# Patient Record
Sex: MALE | Race: OTHER | HISPANIC OR LATINO
[De-identification: names, ages, dates, MRNs, and addresses within clinical notes are randomized per-mention and may not be internally consistent; named-entity substitution may affect disease eponyms.]

---
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
## 2020-11-16 ENCOUNTER — APPOINTMENT (OUTPATIENT)
Dept: PEDIATRICS | Facility: CLINIC | Age: 20
End: 2020-11-16
Payer: MEDICAID

## 2020-11-16 VITALS — TEMPERATURE: 99.6 F | WEIGHT: 150.3 LBS

## 2020-11-16 DIAGNOSIS — Z87.09 PERSONAL HISTORY OF OTHER DISEASES OF THE RESPIRATORY SYSTEM: ICD-10-CM

## 2020-11-16 PROCEDURE — 99072 ADDL SUPL MATRL&STAF TM PHE: CPT

## 2020-11-16 PROCEDURE — 99214 OFFICE O/P EST MOD 30 MIN: CPT

## 2020-11-16 RX ORDER — IBUPROFEN 800 MG/1
800 TABLET, FILM COATED ORAL 3 TIMES DAILY
Qty: 15 | Refills: 0 | Status: ACTIVE | COMMUNITY
Start: 2020-11-16 | End: 1900-01-01

## 2020-11-16 NOTE — HISTORY OF PRESENT ILLNESS
[de-identified] : Pt states lower back pain since yesterday [FreeTextEntry6] : 2 weeks low back pain insidious onset yesterday was "bad"\par no known injury\par doesn’t heavy lift\par isnt exercising a lot\par no loss bowel/bladder continence\par no numbness/tingling in the legs or elsewhere\par no weakness\par no bruising\par no dysuria\par no myalgias\par No fever, No cough, No ear pain, No nasal congestion\par No wheezing\par Normal appetite, No vomiting, No diarrhea\par \par \par no recent travel or contact with anyone suspected of or positive for covid-19\par

## 2020-11-16 NOTE — PHYSICAL EXAM
[NL] : no acute distress, alert [Normocephalic] : normocephalic [EOMI] : EOMI [Clear TM bilaterally] : clear tympanic membranes bilaterally [Clear to Auscultation Bilaterally] : clear to auscultation bilaterally [Regular Rate and Rhythm] : regular rate and rhythm [Normal S1, S2 audible] : normal S1, S2 audible [Soft] : soft [NonTender] : non tender [Non Distended] : non distended [No Abnormal Lymph Nodes Palpated] : no abnormal lymph nodes palpated [Moves All Extremities x 4] : moves all extremities x4 [Warm, Well Perfused x4] : warm, well perfused x4 [Capillary Refill <2s] : capillary refill < 2s [Normotonic] : normotonic [de-identified] : straigt leg raise test negative B/L, sacral tenderness midline no swelling, no deformity, is slow to get up and down off exam table [de-identified] : muscle strength WNL B/L lower extremities  [de-identified] : no rash no bruising

## 2020-12-13 ENCOUNTER — EMERGENCY (EMERGENCY)
Facility: HOSPITAL | Age: 20
LOS: 1 days | Discharge: HOME | End: 2020-12-13
Attending: EMERGENCY MEDICINE
Payer: SELF-PAY

## 2020-12-13 VITALS
RESPIRATION RATE: 18 BRPM | HEART RATE: 78 BPM | HEIGHT: 72 IN | WEIGHT: 160.06 LBS | OXYGEN SATURATION: 100 % | SYSTOLIC BLOOD PRESSURE: 125 MMHG | DIASTOLIC BLOOD PRESSURE: 82 MMHG | TEMPERATURE: 99 F

## 2020-12-13 PROCEDURE — 99282 EMERGENCY DEPT VISIT SF MDM: CPT | Mod: 25

## 2020-12-13 PROCEDURE — 99053 MED SERV 10PM-8AM 24 HR FAC: CPT

## 2020-12-13 PROCEDURE — 12001 RPR S/N/AX/GEN/TRNK 2.5CM/<: CPT

## 2020-12-13 PROCEDURE — 99283 EMERGENCY DEPT VISIT LOW MDM: CPT | Mod: 25

## 2020-12-13 PROCEDURE — 12001 RPR S/N/AX/GEN/TRNK 2.5CM/<: CPT | Mod: FA

## 2020-12-13 NOTE — ED PROVIDER NOTE - PATIENT PORTAL LINK FT
You can access the FollowMyHealth Patient Portal offered by [facility] by registering at the following website: [email or web address]. By joining [employer]’s FollowMyHealth portal, you will also be able to view your health information using other applications (apps) compatible with our system.

## 2020-12-13 NOTE — ED PROVIDER NOTE - ATTENDING CONTRIBUTION TO CARE
seen with acp; healthy young male with laceration to left hand s/p slipped while using ; on exam, lateral aspect of thenar eminence has a 3cm, clean edge, longitudinal laceration; sensation fully intact over thenar eminence, +able to oppose thumb to pinky; normal flexor and extensor functions throughout all digits; normal sensation and cap refill to distal aspect of all digits; agree with acp plan of care

## 2020-12-13 NOTE — ED PROVIDER NOTE - NSFOLLOWUPINSTRUCTIONS_ED_ALL_ED_FT
suture removal in 10-14 days  monitor for signs of infection such as redness puss from the wound or fevers   tylenol and or motrin for pain control     Laceration  A laceration is a cut that goes through all of the layers of the skin and into the tissue that is right under the skin. Some lacerations heal on their own. Others need to be closed with skin adhesive strips, skin glue, stitches (sutures), or staples. Proper laceration care minimizes the risk of infection and helps the laceration to heal better.  If non-absorbable stitches or staples have been placed, they must be taken out within the time frame instructed by your healthcare provider.    SEEK IMMEDIATE MEDICAL CARE IF YOU HAVE ANY OF THE FOLLOWING SYMPTOMS: swelling around the wound, worsening pain, drainage from the wound, red streaking going away from your wound, inability to move finger or toe near the laceration, or discoloration of skin near the laceration.

## 2020-12-13 NOTE — ED PROVIDER NOTE - PROGRESS NOTE DETAILS
wound repaired, advised on wound care and fu with pmd. given strict return precautions verbalizes understanding

## 2020-12-13 NOTE — ED PROVIDER NOTE - PHYSICAL EXAMINATION
2 cm laceration to the lateral aspect of the 1st digit on left hand no exposed tendon. + active bleeding  FROM of joint spaces. 5/5 finger strength + opposition.    2+ radial and ulnar pulse . sensation intact

## 2020-12-13 NOTE — ED PROVIDER NOTE - OBJECTIVE STATEMENT
20e yo male no significant past medical hx presenting to the ER with laceration  to the left 1st digit, cut with  at work. no numbness or tingling to the fingers. denies further injuries

## 2020-12-23 ENCOUNTER — EMERGENCY (EMERGENCY)
Facility: HOSPITAL | Age: 20
LOS: 1 days | Discharge: HOME | End: 2020-12-23
Attending: EMERGENCY MEDICINE
Payer: SELF-PAY

## 2020-12-23 VITALS
DIASTOLIC BLOOD PRESSURE: 74 MMHG | HEIGHT: 72 IN | SYSTOLIC BLOOD PRESSURE: 112 MMHG | RESPIRATION RATE: 18 BRPM | TEMPERATURE: 98 F | WEIGHT: 154.98 LBS | OXYGEN SATURATION: 96 % | HEART RATE: 108 BPM

## 2020-12-23 PROCEDURE — G0463: CPT

## 2020-12-23 PROCEDURE — L9995: CPT

## 2020-12-23 NOTE — ED PROVIDER NOTE - CLINICAL SUMMARY MEDICAL DECISION MAKING FREE TEXT BOX
PT with stable VS, no acute distress, non toxic appearing, tolerating PO in the ED, Pt neuro vasc intact, no s/s of infection, wound clean dry, suture removed, pt to be dc home with supportive care, follow up to PCP, educated about when to return to the ED if needed. PT verbalizes that he understands all instructions and results. Pt infomred that ED is open and availible 24/7 365 days a yr, encouraged to return to the ED if they have any change in condition, or feel the need for revaluation.

## 2020-12-23 NOTE — ED PROVIDER NOTE - PATIENT PORTAL LINK FT
You can access the FollowMyHealth Patient Portal offered by [facility] by registering at the following website: [email or web address]. By joining [employer]’s FollowMyHealth portal, you will also be able to view your health information using other applications (apps) compatible with our system.

## 2020-12-23 NOTE — ED PROVIDER NOTE - NS ED ROS FT
ROS: CONTUSIONAL: Denies fever, chills, fatigue, wt loss. HEAD: Denies trauma, HA, Dizziness. EYE: Denies Acute visual changes, diplopia. ENMT: Denies change in hearing, tinnitus, epistaxis, difficulty swallowing, sore throat. CARDIO: Denies CP, palpitations, edema. RESP: Denies Cough, SOB , Diff breathing, hemoptysis. GI: Denies N/V, ABD pain, change in bowel movement. URINARY: Denies difficulty urinating, pelvic pain. MS:  Denies joint pain, back pain, weakness, decreased ROM, swelling. NEURO: Denies change in gait, seizures, loss of sensation, dizziness, confusion LOC.  PSY: NO SI/HI. SKIN: +laceration, dines rash, discharge.

## 2020-12-23 NOTE — ED PROVIDER NOTE - CHPI ED SYMPTOMS NEG
no bleeding/no bleeding at site/no drainage/no fever/no inflammation/no pain/no purulent drainage/no redness/no red streaks/no chills

## 2020-12-23 NOTE — ED PROVIDER NOTE - NSFOLLOWUPINSTRUCTIONS_ED_ALL_ED_FT
Patient education: Stitches and staples (The Basics)  View in [employer]  Written by the doctors and editors at [facility]  What are stitches?  Stitches are a way doctors can close certain types of cuts. A doctor uses a special needle and thread to put in stitches. He or she sews the edges of the cut together and ties knots to hold the stitches in place (figure 1). The term doctors use for stitches is "sutures."    There are 2 main types of stitches:    ?Absorbable – These stitches dissolve over time. They do not need to be taken out.    ?Non-absorbable – These stitches need to be taken out after a certain amount of time. They do not dissolve.    What are staples?  Another way doctors can close cuts is with staples. Staples that go in the body are different from those used on paper. To put staples in, doctors use a special stapler (figure 2). Staples need to be taken out after a certain amount of time, just like non-absorbable stitches.    How do I know if I need stitches or staples?  You will need stitches or staples if your cut is wide, jagged, or goes all the way through your skin. A cut will heal on its own without stitches or staples, but they help a cut heal faster and leave less of a scar.    Minor cuts and scrapes that do not go all the way through the skin do not need stitches. If you get a cut and don't know if you need stitches, check with your doctor or nurse.    What happens when I get stitches or staples?  Before the doctor stitches or staples your cut, he or she will clean out the cut well. He or she will also give you numbing medicine so that you don't feel pain when the stitches or staples go in.    After the doctor stitches or staples your cut, he or she will cover the area with gauze or a bandage.    Why is it important to take care of my stitches or staples?  It's important to take care of your stitches or staples so that your cut heals well and doesn't get infected.    How do I take care of my stitches or staples?  Your doctor or nurse will give you specific instructions, depending on the type of stitches you have and where they are. Staples need the same type of care as non-absorbable stitches.    Here is some general advice you can follow:    ?Keep your stitches or staples dry and covered with a bandage. Non-absorbable stitches and staples need to be kept dry for 1 to 2 days. Absorbable stitches need to be kept dry longer. Your doctor or nurse will tell you exactly how long to keep your stitches dry.    ?Once you no longer need to keep your stitches or staples dry, gently wash them with soap and water whenever you take a shower. Do not put your stitches or staples underwater, such as in a bath, pool, or lake. Getting them too wet can slow down healing and raise your chance of getting an infection.    ?After you wash your stitches or staples, pat them dry and put an antibiotic ointment on them.    ?Cover your stitches or staples with a bandage or gauze, unless your doctor or nurse tells you not to.    ?Avoid activities or sports that could hurt the area of your stitches or staples for 1 to 2 weeks. (Your doctor or nurse will tell you exactly how long to avoid these activities.) If you hurt the same part of your body again, stitches can break, and the cut can open up again.    When should I call the doctor or nurse?  Call your doctor or nurse if:    ?Your stitches break or the cut opens up again.    ?You get a fever.    ?You have redness or swelling around the cut, or pus drains from the cut. It is normal for clear yellow fluid to drain from the cut in the first few days.    When will my stitches or staples be taken out?  The doctor who puts in the stitches or staples will tell you when to see your doctor or nurse to have them taken out. Non-absorbable stitches usually stay in for 5 to 14 days, depending on where they are. Staples usually stay in for 7 to 14 days because they are placed on parts of the body like the scalp, arms, or legs.    Staples need to be taken out with a special staple remover. But doctors' offices don't always have this device. Ask the doctor who puts in your staples for a staple remover. Then bring it to your doctor's office when you have your staples taken out.    What should I do after my stitches or staples are out?  After your stitches or staples are out, you should protect the scar from the sun. Use sunscreen on the area or wear clothes or a hat that covers the scar.    Your doctor or nurse might also recommend that you use certain lotions or creams to help your scar heal.

## 2020-12-23 NOTE — ED PROVIDER NOTE - ADDITIONAL NOTES AND INSTRUCTIONS:
PT was evaluated At [facility] ED and was found to have a condition that warranted time of to rest and heal from WORK/SCHOOL.   Jairon Flores PA-C

## 2021-01-30 ENCOUNTER — APPOINTMENT (OUTPATIENT)
Dept: PEDIATRICS | Facility: CLINIC | Age: 21
End: 2021-01-30

## 2021-09-19 ENCOUNTER — APPOINTMENT (OUTPATIENT)
Dept: PEDIATRICS | Facility: CLINIC | Age: 21
End: 2021-09-19
Payer: MEDICAID

## 2021-09-19 VITALS
HEART RATE: 88 BPM | WEIGHT: 141.4 LBS | SYSTOLIC BLOOD PRESSURE: 110 MMHG | BODY MASS INDEX: 19.36 KG/M2 | DIASTOLIC BLOOD PRESSURE: 60 MMHG | HEIGHT: 71.5 IN

## 2021-09-19 DIAGNOSIS — M54.5 LOW BACK PAIN: ICD-10-CM

## 2021-09-19 DIAGNOSIS — Z00.00 ENCOUNTER FOR GENERAL ADULT MEDICAL EXAMINATION W/OUT ABNORMAL FINDINGS: ICD-10-CM

## 2021-09-19 PROCEDURE — 92551 PURE TONE HEARING TEST AIR: CPT

## 2021-09-19 PROCEDURE — 99395 PREV VISIT EST AGE 18-39: CPT | Mod: 25

## 2021-09-19 PROCEDURE — 96160 PT-FOCUSED HLTH RISK ASSMT: CPT

## 2021-09-19 NOTE — RISK ASSESSMENT
[0] : 2) Feeling down, depressed, or hopeless: Not at all (0) [[id number]] : 0 [Have you ever had exercise-related chest pain or shortness of breath?] : Have you ever had exercise-related chest pain or shortness of breath? No [Have you ever fainted, passed out or had an unexplained seizure suddenly and without warning, especially during exercise or in response] : Have you ever fainted, passed out or had an unexplained seizure suddenly and without warning, especially during exercise or in response to sudden loud noises such as doorbells, alarm clocks and ringing telephones? No [Has anyone in your immediate family (parents, grandparents, siblings) or other more distant relatives (aunts, uncles, cousins)  of heart] : Has anyone in your immediate family (parents, grandparents, siblings) or other more distant relatives (aunts, uncles, cousins)  of heart problems or had an unexpected sudden death before age 50 (This would include unexpected drownings, unexplained car accidents in which the relative was driving or sudden infant death syndrome.)? No [Are you related to anyone with hypertrophic cardiomyopathy or hypertrophic obstructive cardiomyopathy, Marfan syndrome, arrhythmogenic] : Are you related to anyone with hypertrophic cardiomyopathy or hypertrophic obstructive cardiomyopathy, Marfan syndrome, arrhythmogenic right ventricular cardiomyopathy, long QT syndrome, short QT syndrome, Brugada syndrome or catecholaminergic polymorphic ventricular tachycardia, or anyone younger than 50 years with a pacemaker or implantable defibrillator? No [No Increased risk of SCA or SCD] : No Increased risk of SCA or SCD

## 2021-09-19 NOTE — DISCUSSION/SUMMARY
[Met privately with the adolescent for part of the office visit?] : Met privately with the adolescent for part of the office visit? Yes [Adolescent demonstrates understanding of his/her conditions and how to take prescribed medications?] : Adolescent demonstrates understanding of his/her conditions and how to take prescribed medications? Yes [Adolescent asks questions during each office  visit and participates in the care plan?] : Adolescent asks questions during each office visit and participates in the care plan? Yes [Adolescent is competent in independently making appointments, filling prescriptions, following up on referrals, and seeking emergency services, as needed?] : Adolescent is competent in independently making appointments, filling prescriptions, following up on referrals, and seeking emergency services, as needed? Yes [Discussed using Follow My Health to access health records and communicate with the adolescent's care team?] : Discussed using Follow My Health to access health records and communicate with the adolescent's care team? Yes [FreeTextEntry1] : none [Adolescent's caregivers were provided with the opportunity to discuss their concerns about transferring decision making responsibility to the adolescent?] : Adolescent's caregivers were provided with the opportunity to discuss their concerns about transferring decision making responsibility to the adolescent? No [Initiated discussion about transfer to an adult healthcare provider?] : Initiated discussion about transfer to an adult healthcare provider? Yes

## 2021-09-19 NOTE — PHYSICAL EXAM
[Alert] : alert [No Acute Distress] : no acute distress [Normocephalic] : normocephalic [EOMI Bilateral] : EOMI bilateral [Clear tympanic membranes with bony landmarks and light reflex present bilaterally] : clear tympanic membranes with bony landmarks and light reflex present bilaterally  [Pink Nasal Mucosa] : pink nasal mucosa [Nonerythematous Oropharynx] : nonerythematous oropharynx [No Palpable Masses] : no palpable masses [Supple, full passive range of motion] : supple, full passive range of motion [Clear to Auscultation Bilaterally] : clear to auscultation bilaterally [Regular Rate and Rhythm] : regular rate and rhythm [Normal S1, S2 audible] : normal S1, S2 audible [No Murmurs] : no murmurs [+2 Femoral Pulses] : +2 femoral pulses [Soft] : soft [NonTender] : non tender [Non Distended] : non distended [Normoactive Bowel Sounds] : normoactive bowel sounds [No Hepatomegaly] : no hepatomegaly [No Splenomegaly] : no splenomegaly [Mark: _____] : Mark [unfilled] [Circumcised] : circumcised [Bilateral descended testes] : bilateral descended testes [No Testicular Masses] : no testicular masses [No Abnormal Lymph Nodes Palpated] : no abnormal lymph nodes palpated [Normal Muscle Tone] : normal muscle tone [No Gait Asymmetry] : no gait asymmetry [No pain or deformities with palpation of bone, muscles, joints] : no pain or deformities with palpation of bone, muscles, joints [+2 Patella DTR] : +2 patella DTR [Straight] : straight [No Rash or Lesions] : no rash or lesions [No Scoliosis] : no scoliosis

## 2021-09-19 NOTE — HISTORY OF PRESENT ILLNESS
[Yes] : Patient has had sexual intercourse. [No] : No cigarette smoke exposure [HIV Screening Declined] : HIV Screening Declined [Has ways to cope with stress] : has ways to cope with stress [Displays self-confidence] : displays self-confidence [Has problems with sleep] : does not have problems with sleep [Gets depressed, anxious, or irritable/has mood swings] : does not get depressed, anxious, or irritable/has mood swings [Has thought about hurting self or considered suicide] : has not thought about hurting self or considered suicide [With Teen] : teen [FreeTextEntry7] : 21 year well check [de-identified] : none [FreeTextEntry1] : Patient is doing well - has no concerns or issues.  \par No reactions to previous vaccinations.\par Denies depression or psychiatric issues. \par No mental health issues, not in counseling.\par No suicidal ideations\par Appetite good, no issues\par Not sexually active\par Smokes MJ and occasional ETOH, no vaping, no drunk or impaired driving\par No tobacco exposure\par Sleeping well with good sleeping patterns \par No problems in school identified -  no ADD/ADHD concerns.\par No recent severe illness or injury,  no emergency room visit, and  no trauma to the head /concussion.\par PHQ9 and CRAFFT reviewed, no issues\par

## 2022-04-10 ENCOUNTER — EMERGENCY (EMERGENCY)
Facility: HOSPITAL | Age: 22
LOS: 1 days | Discharge: HOME | End: 2022-04-10
Attending: EMERGENCY MEDICINE
Payer: COMMERCIAL

## 2022-04-10 VITALS
TEMPERATURE: 98 F | HEIGHT: 71 IN | WEIGHT: 156.97 LBS | HEART RATE: 154 BPM | SYSTOLIC BLOOD PRESSURE: 108 MMHG | RESPIRATION RATE: 20 BRPM | DIASTOLIC BLOOD PRESSURE: 78 MMHG | OXYGEN SATURATION: 98 %

## 2022-04-10 VITALS — OXYGEN SATURATION: 98 % | HEART RATE: 76 BPM | RESPIRATION RATE: 18 BRPM

## 2022-04-10 LAB
ALBUMIN SERPL ELPH-MCNC: 5 G/DL — SIGNIFICANT CHANGE UP (ref 3.3–5.2)
ALP SERPL-CCNC: 62 U/L — SIGNIFICANT CHANGE UP (ref 40–120)
ALT FLD-CCNC: 13 U/L — SIGNIFICANT CHANGE UP
ANION GAP SERPL CALC-SCNC: 24 MMOL/L — HIGH (ref 5–17)
AST SERPL-CCNC: 20 U/L — SIGNIFICANT CHANGE UP
BILIRUB SERPL-MCNC: 0.7 MG/DL — SIGNIFICANT CHANGE UP (ref 0.4–2)
BUN SERPL-MCNC: 9.3 MG/DL — SIGNIFICANT CHANGE UP (ref 8–20)
CALCIUM SERPL-MCNC: 9.5 MG/DL — SIGNIFICANT CHANGE UP (ref 8.6–10.2)
CHLORIDE SERPL-SCNC: 97 MMOL/L — LOW (ref 98–107)
CO2 SERPL-SCNC: 16 MMOL/L — LOW (ref 22–29)
CREAT SERPL-MCNC: 0.86 MG/DL — SIGNIFICANT CHANGE UP (ref 0.5–1.3)
EGFR: 126 ML/MIN/1.73M2 — SIGNIFICANT CHANGE UP
GLUCOSE SERPL-MCNC: 116 MG/DL — HIGH (ref 70–99)
HCT VFR BLD CALC: 41.9 % — SIGNIFICANT CHANGE UP (ref 39–50)
HGB BLD-MCNC: 14.6 G/DL — SIGNIFICANT CHANGE UP (ref 13–17)
MCHC RBC-ENTMCNC: 30.9 PG — SIGNIFICANT CHANGE UP (ref 27–34)
MCHC RBC-ENTMCNC: 34.8 GM/DL — SIGNIFICANT CHANGE UP (ref 32–36)
MCV RBC AUTO: 88.8 FL — SIGNIFICANT CHANGE UP (ref 80–100)
PLATELET # BLD AUTO: 410 K/UL — HIGH (ref 150–400)
POTASSIUM SERPL-MCNC: 3.5 MMOL/L — SIGNIFICANT CHANGE UP (ref 3.5–5.3)
POTASSIUM SERPL-SCNC: 3.5 MMOL/L — SIGNIFICANT CHANGE UP (ref 3.5–5.3)
PROT SERPL-MCNC: 7.5 G/DL — SIGNIFICANT CHANGE UP (ref 6.6–8.7)
RBC # BLD: 4.72 M/UL — SIGNIFICANT CHANGE UP (ref 4.2–5.8)
RBC # FLD: 11.9 % — SIGNIFICANT CHANGE UP (ref 10.3–14.5)
SODIUM SERPL-SCNC: 137 MMOL/L — SIGNIFICANT CHANGE UP (ref 135–145)
TSH SERPL-MCNC: 0.65 UIU/ML — SIGNIFICANT CHANGE UP (ref 0.27–4.2)
WBC # BLD: 10.81 K/UL — HIGH (ref 3.8–10.5)
WBC # FLD AUTO: 10.81 K/UL — HIGH (ref 3.8–10.5)

## 2022-04-10 PROCEDURE — 84443 ASSAY THYROID STIM HORMONE: CPT

## 2022-04-10 PROCEDURE — 96374 THER/PROPH/DIAG INJ IV PUSH: CPT

## 2022-04-10 PROCEDURE — 80053 COMPREHEN METABOLIC PANEL: CPT

## 2022-04-10 PROCEDURE — 99284 EMERGENCY DEPT VISIT MOD MDM: CPT | Mod: 25

## 2022-04-10 PROCEDURE — 99284 EMERGENCY DEPT VISIT MOD MDM: CPT

## 2022-04-10 PROCEDURE — 85027 COMPLETE CBC AUTOMATED: CPT

## 2022-04-10 PROCEDURE — 36415 COLL VENOUS BLD VENIPUNCTURE: CPT

## 2022-04-10 PROCEDURE — 93005 ELECTROCARDIOGRAM TRACING: CPT

## 2022-04-10 PROCEDURE — 93010 ELECTROCARDIOGRAM REPORT: CPT

## 2022-04-10 RX ORDER — SODIUM CHLORIDE 9 MG/ML
1000 INJECTION INTRAMUSCULAR; INTRAVENOUS; SUBCUTANEOUS ONCE
Refills: 0 | Status: COMPLETED | OUTPATIENT
Start: 2022-04-10 | End: 2022-04-10

## 2022-04-10 RX ADMIN — Medication 1 MILLIGRAM(S): at 19:57

## 2022-04-10 RX ADMIN — SODIUM CHLORIDE 1000 MILLILITER(S): 9 INJECTION INTRAMUSCULAR; INTRAVENOUS; SUBCUTANEOUS at 19:50

## 2022-04-10 NOTE — ED PROVIDER NOTE - NSFOLLOWUPCLINICS_GEN_ALL_ED_FT
[facility]  Cardiology  [address], [address]  [place] [address]  Phone: [phone]  Fax:

## 2022-04-10 NOTE — ED PROVIDER NOTE - PHYSICAL EXAMINATION
Gen: Well appearing in NAD  Head: NC/AT  Neck: trachea midline  Cardiac: tachycardic, regular rhythm  Resp:  No distress; CTAB  Abd: Soft, NT/ND  Ext: no deformities  Neuro:  A&O appears non focal  Skin:  Warm and dry as visualized  Psych: (+) anxiety

## 2022-04-10 NOTE — ED PROVIDER NOTE - OBJECTIVE STATEMENT
23 y/o male with PMHx of heart murmur presents to ED c/o palpitations. Patient drank 1/2 a bottle of an energy drink, smoked marijuana and nicotine about 1 hour ago while playing video games. Patient states he has been stressed recently due to his mother being in the hospital.     Denies other drug use, surgical hx, smoking tobacco, family cardiac hx 21 y/o male with PMHx of heart murmur presents to ED c/o palpitations. Patient drank 1/2 a bottle of an energy drink, smoked marijuana and vaped nicotine about 1 hour ago while playing video games. Patient states he has been stressed recently due to his mother being in the hospital.     Denies other drug use, surgical hx, smoking tobacco, family cardiac hx

## 2022-04-10 NOTE — ED PROVIDER NOTE - NSFOLLOWUPINSTRUCTIONS_ED_ALL_ED_FT
- Follow up with your doctor within 2-3 days.   - Follow up with Cardiology, see information above.   - Bring results with you to the appointment.   - Return to the ED for any new or worsening symptoms.     Palpitations    A palpitation is the feeling that your heartbeat is irregular or is faster than normal. It may feel like your heart is fluttering or skipping a beat. They may be caused by many things, including smoking, caffeine, alcohol, stress, and certain medicines. Although most causes of palpitations are not serious, palpitations can be a sign of a serious medical problem. Avoid caffeine, alcohol, and tobacco products at home. Try to reduce stress and anxiety and make sure to get plenty of rest.     SEEK IMMEDIATE MEDICAL CARE IF YOU HAVE ANY OF THE FOLLOWING SYMPTOMS: chest pain, shortness of breath, severe headache, dizziness/lightheadedness, or fainting.

## 2022-04-10 NOTE — ED ADULT NURSE NOTE - OBJECTIVE STATEMENT
PT. c/o palpitations, difficulty "catching my breath".  Pt. states he drank 1/2 bottle of energy drink while hyacinth and felt his heart racing, also c/o bilateral hand numbness.  Denies pain.  friend bedside. PT. c/o palpitations, difficulty "catching my breath".  Pt. states he drank 1/2 bottle of energy drink while hyacinth and felt his heart racing, also c/o bilateral hand numbness.  Denies pain.  friend bedside.  also vaped nicotine and weed.

## 2022-04-10 NOTE — ED PROVIDER NOTE - CLINICAL SUMMARY MEDICAL DECISION MAKING FREE TEXT BOX
Patient with palpitations and anxiety s/p energy drink, weed, and nicotine and feeling stressed with mother in the hospital. Plan for EKG, labs, fluids, benzo' s, and re-assess.

## 2022-04-10 NOTE — ED PROVIDER NOTE - PROGRESS NOTE DETAILS
Leoncio MARTINES for ED attending, Dr. Heredia. Reassess patient, states he is feeling better. While he was having palpitations, states he felt tingling around his mouth, and fingers and felt like he was going to die. HR is down to 110. Yan ESPANA: symptoms resolved, HR in 70s. Encouraged to follow with PMD. Cards follow up given. Return precautions given. Leoncio MARTINES for ED attending, Dr. Heredia. Reasseseds patient, states he is feeling better. While he was having palpitations, states he felt tingling around his mouth, and fingers and felt like he was going to die. HR is down to 110.

## 2022-04-10 NOTE — ED PROVIDER NOTE - PATIENT PORTAL LINK FT
You can access the FollowMyHealth Patient Portal offered by [facility] by registering at the following website: [email or web address]. By joining [employer]’s FollowMyHealth portal, you will also be able to view your health information using other applications (apps) compatible with our system.

## 2022-09-09 NOTE — ED PROVIDER NOTE - ATTENDING CONTRIBUTION TO CARE
Called pt's Reyna SCHOFIELD. Left vm to return call.     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 No             ANTIPLATELET / ANTICOAGULATION: MEDICATION:  None  Latex allergy: No  Sleep apnea: No  Location: [facility]  Special Instructions:   MAC Anesthesia  For MAC/GA patients if applicable, please verify to hold medications prior to procedure: Selegiline patches x 10 days  Sildenafil, Verdenafil, Tadalafil x 48 hours   Monoamine oxidase inhibitor (MAOIs), angiotensin receptor blockers, renin inhibitors, ACE inhibitors, diuretics (unless in combination with beta blocker) day of procedure        I, Destin Lockhart, have personally performed a face to face diagnostic evaluation on this patient. I have reviewed the ACP note and agree with the history, exam and plan of care, except as noted.    21 yo M p/w suture removal. patient had laceration to left 1st digit on 12/13. wound is well healed. no surrounding erythema. sutures removed.

## 2023-11-04 ENCOUNTER — EMERGENCY (EMERGENCY)
Facility: HOSPITAL | Age: 23
LOS: 1 days | Discharge: HOME | End: 2023-11-04
Attending: EMERGENCY MEDICINE
Payer: COMMERCIAL

## 2023-11-04 VITALS
HEIGHT: 72 IN | DIASTOLIC BLOOD PRESSURE: 67 MMHG | WEIGHT: 171.52 LBS | RESPIRATION RATE: 20 BRPM | HEART RATE: 108 BPM | OXYGEN SATURATION: 96 % | TEMPERATURE: 98 F | SYSTOLIC BLOOD PRESSURE: 125 MMHG

## 2023-11-04 PROCEDURE — 99284 EMERGENCY DEPT VISIT MOD MDM: CPT | Mod: 25

## 2023-11-04 PROCEDURE — 93010 ELECTROCARDIOGRAM REPORT: CPT

## 2023-11-04 NOTE — ED ADULT TRIAGE NOTE - CHIEF COMPLAINT QUOTE
Patient presents to ED with heart palpitations with h/o SVT that started about one hour ago associated with lightheadedness.  Denies Drugs/ETOH

## 2023-11-05 ENCOUNTER — EMERGENCY (EMERGENCY)
Facility: HOSPITAL | Age: 23
LOS: 1 days | Discharge: HOME | End: 2023-11-05
Attending: EMERGENCY MEDICINE
Payer: COMMERCIAL

## 2023-11-05 VITALS
OXYGEN SATURATION: 96 % | HEART RATE: 64 BPM | DIASTOLIC BLOOD PRESSURE: 67 MMHG | TEMPERATURE: 97 F | RESPIRATION RATE: 20 BRPM | SYSTOLIC BLOOD PRESSURE: 115 MMHG

## 2023-11-05 VITALS
OXYGEN SATURATION: 98 % | DIASTOLIC BLOOD PRESSURE: 72 MMHG | TEMPERATURE: 98 F | RESPIRATION RATE: 18 BRPM | SYSTOLIC BLOOD PRESSURE: 118 MMHG | HEART RATE: 68 BPM

## 2023-11-05 VITALS
HEIGHT: 72 IN | HEART RATE: 64 BPM | TEMPERATURE: 98 F | RESPIRATION RATE: 18 BRPM | DIASTOLIC BLOOD PRESSURE: 76 MMHG | WEIGHT: 168.87 LBS | SYSTOLIC BLOOD PRESSURE: 117 MMHG | OXYGEN SATURATION: 97 %

## 2023-11-05 LAB
ALBUMIN SERPL ELPH-MCNC: 4.6 G/DL — SIGNIFICANT CHANGE UP (ref 3.3–5.2)
ALBUMIN SERPL ELPH-MCNC: 4.6 G/DL — SIGNIFICANT CHANGE UP (ref 3.3–5.2)
ALP SERPL-CCNC: 72 U/L — SIGNIFICANT CHANGE UP (ref 40–120)
ALP SERPL-CCNC: 72 U/L — SIGNIFICANT CHANGE UP (ref 40–120)
ALT FLD-CCNC: 17 U/L — SIGNIFICANT CHANGE UP
ALT FLD-CCNC: 17 U/L — SIGNIFICANT CHANGE UP
ANION GAP SERPL CALC-SCNC: 14 MMOL/L — SIGNIFICANT CHANGE UP (ref 5–17)
ANION GAP SERPL CALC-SCNC: 14 MMOL/L — SIGNIFICANT CHANGE UP (ref 5–17)
AST SERPL-CCNC: 20 U/L — SIGNIFICANT CHANGE UP
AST SERPL-CCNC: 20 U/L — SIGNIFICANT CHANGE UP
BILIRUB SERPL-MCNC: 0.7 MG/DL — SIGNIFICANT CHANGE UP (ref 0.4–2)
BILIRUB SERPL-MCNC: 0.7 MG/DL — SIGNIFICANT CHANGE UP (ref 0.4–2)
BUN SERPL-MCNC: 9.9 MG/DL — SIGNIFICANT CHANGE UP (ref 8–20)
BUN SERPL-MCNC: 9.9 MG/DL — SIGNIFICANT CHANGE UP (ref 8–20)
CALCIUM SERPL-MCNC: 8.9 MG/DL — SIGNIFICANT CHANGE UP (ref 8.4–10.5)
CALCIUM SERPL-MCNC: 8.9 MG/DL — SIGNIFICANT CHANGE UP (ref 8.4–10.5)
CHLORIDE SERPL-SCNC: 99 MMOL/L — SIGNIFICANT CHANGE UP (ref 96–108)
CHLORIDE SERPL-SCNC: 99 MMOL/L — SIGNIFICANT CHANGE UP (ref 96–108)
CO2 SERPL-SCNC: 25 MMOL/L — SIGNIFICANT CHANGE UP (ref 22–29)
CO2 SERPL-SCNC: 25 MMOL/L — SIGNIFICANT CHANGE UP (ref 22–29)
CREAT SERPL-MCNC: 0.93 MG/DL — SIGNIFICANT CHANGE UP (ref 0.5–1.3)
CREAT SERPL-MCNC: 0.93 MG/DL — SIGNIFICANT CHANGE UP (ref 0.5–1.3)
EGFR: 118 ML/MIN/1.73M2 — SIGNIFICANT CHANGE UP
EGFR: 118 ML/MIN/1.73M2 — SIGNIFICANT CHANGE UP
GLUCOSE SERPL-MCNC: 87 MG/DL — SIGNIFICANT CHANGE UP (ref 70–99)
GLUCOSE SERPL-MCNC: 87 MG/DL — SIGNIFICANT CHANGE UP (ref 70–99)
HCT VFR BLD CALC: 43.8 % — SIGNIFICANT CHANGE UP (ref 39–50)
HCT VFR BLD CALC: 43.8 % — SIGNIFICANT CHANGE UP (ref 39–50)
HGB BLD-MCNC: 15.5 G/DL — SIGNIFICANT CHANGE UP (ref 13–17)
HGB BLD-MCNC: 15.5 G/DL — SIGNIFICANT CHANGE UP (ref 13–17)
MCHC RBC-ENTMCNC: 31.1 PG — SIGNIFICANT CHANGE UP (ref 27–34)
MCHC RBC-ENTMCNC: 31.1 PG — SIGNIFICANT CHANGE UP (ref 27–34)
MCHC RBC-ENTMCNC: 35.4 GM/DL — SIGNIFICANT CHANGE UP (ref 32–36)
MCHC RBC-ENTMCNC: 35.4 GM/DL — SIGNIFICANT CHANGE UP (ref 32–36)
MCV RBC AUTO: 87.8 FL — SIGNIFICANT CHANGE UP (ref 80–100)
MCV RBC AUTO: 87.8 FL — SIGNIFICANT CHANGE UP (ref 80–100)
PLATELET # BLD AUTO: 297 K/UL — SIGNIFICANT CHANGE UP (ref 150–400)
PLATELET # BLD AUTO: 297 K/UL — SIGNIFICANT CHANGE UP (ref 150–400)
POTASSIUM SERPL-MCNC: 3.8 MMOL/L — SIGNIFICANT CHANGE UP (ref 3.5–5.3)
POTASSIUM SERPL-MCNC: 3.8 MMOL/L — SIGNIFICANT CHANGE UP (ref 3.5–5.3)
POTASSIUM SERPL-SCNC: 3.8 MMOL/L — SIGNIFICANT CHANGE UP (ref 3.5–5.3)
POTASSIUM SERPL-SCNC: 3.8 MMOL/L — SIGNIFICANT CHANGE UP (ref 3.5–5.3)
PROT SERPL-MCNC: 7.1 G/DL — SIGNIFICANT CHANGE UP (ref 6.6–8.7)
PROT SERPL-MCNC: 7.1 G/DL — SIGNIFICANT CHANGE UP (ref 6.6–8.7)
RBC # BLD: 4.99 M/UL — SIGNIFICANT CHANGE UP (ref 4.2–5.8)
RBC # BLD: 4.99 M/UL — SIGNIFICANT CHANGE UP (ref 4.2–5.8)
RBC # FLD: 11.9 % — SIGNIFICANT CHANGE UP (ref 10.3–14.5)
RBC # FLD: 11.9 % — SIGNIFICANT CHANGE UP (ref 10.3–14.5)
SODIUM SERPL-SCNC: 138 MMOL/L — SIGNIFICANT CHANGE UP (ref 135–145)
SODIUM SERPL-SCNC: 138 MMOL/L — SIGNIFICANT CHANGE UP (ref 135–145)
TSH SERPL-MCNC: 0.48 UIU/ML — SIGNIFICANT CHANGE UP (ref 0.27–4.2)
TSH SERPL-MCNC: 0.48 UIU/ML — SIGNIFICANT CHANGE UP (ref 0.27–4.2)
WBC # BLD: 9.49 K/UL — SIGNIFICANT CHANGE UP (ref 3.8–10.5)
WBC # BLD: 9.49 K/UL — SIGNIFICANT CHANGE UP (ref 3.8–10.5)
WBC # FLD AUTO: 9.49 K/UL — SIGNIFICANT CHANGE UP (ref 3.8–10.5)
WBC # FLD AUTO: 9.49 K/UL — SIGNIFICANT CHANGE UP (ref 3.8–10.5)

## 2023-11-05 PROCEDURE — 93010 ELECTROCARDIOGRAM REPORT: CPT | Mod: 76

## 2023-11-05 PROCEDURE — 93005 ELECTROCARDIOGRAM TRACING: CPT

## 2023-11-05 PROCEDURE — 93010 ELECTROCARDIOGRAM REPORT: CPT

## 2023-11-05 PROCEDURE — 36415 COLL VENOUS BLD VENIPUNCTURE: CPT

## 2023-11-05 PROCEDURE — 84443 ASSAY THYROID STIM HORMONE: CPT

## 2023-11-05 PROCEDURE — 96360 HYDRATION IV INFUSION INIT: CPT

## 2023-11-05 PROCEDURE — 99284 EMERGENCY DEPT VISIT MOD MDM: CPT

## 2023-11-05 PROCEDURE — 99283 EMERGENCY DEPT VISIT LOW MDM: CPT | Mod: 25

## 2023-11-05 PROCEDURE — 80053 COMPREHEN METABOLIC PANEL: CPT

## 2023-11-05 PROCEDURE — 85027 COMPLETE CBC AUTOMATED: CPT

## 2023-11-05 RX ORDER — SODIUM CHLORIDE 9 MG/ML
1000 INJECTION INTRAMUSCULAR; INTRAVENOUS; SUBCUTANEOUS ONCE
Refills: 0 | Status: COMPLETED | OUTPATIENT
Start: 2023-11-05 | End: 2023-11-05

## 2023-11-05 RX ORDER — METOPROLOL TARTRATE 50 MG
25 TABLET ORAL ONCE
Refills: 0 | Status: COMPLETED | OUTPATIENT
Start: 2023-11-05 | End: 2023-11-05

## 2023-11-05 RX ADMIN — SODIUM CHLORIDE 1000 MILLILITER(S): 9 INJECTION INTRAMUSCULAR; INTRAVENOUS; SUBCUTANEOUS at 20:55

## 2023-11-05 RX ADMIN — SODIUM CHLORIDE 1000 MILLILITER(S): 9 INJECTION INTRAMUSCULAR; INTRAVENOUS; SUBCUTANEOUS at 19:52

## 2023-11-05 RX ADMIN — Medication 25 MILLIGRAM(S): at 19:55

## 2023-11-05 NOTE — ED ADULT NURSE NOTE - OBJECTIVE STATEMENT
Presents ed with complaints of palpitations, denies chest pain , denies sob. Patient says " I was playing video games and started to feel lightheaded and palpitations. says " I feel fine now". Patient reports hx of svt. Doesn't take any meds.

## 2023-11-05 NOTE — ED PROVIDER NOTE - CLINICAL SUMMARY MEDICAL DECISION MAKING FREE TEXT BOX
Pt with palpitations with check EKG give IV fluids, check TSH, cbc, cmp. Discussed with pt necessity for outpatient cardiology follow-up Pt with palpitations with check EKG give IV fluids, check TSH, cbc, cmp. Discussed with pt necessity for outpatient cardiology follow-up    Patient resting comfortably no acute distress at this time.  Labs reviewed–within normal limits.  EKG reviewed no acute abnormalities.  Patient stable for discharge will follow-up with his cardiologist outpatient.

## 2023-11-05 NOTE — ED PROVIDER NOTE - ATTENDING APP SHARED VISIT CONTRIBUTION OF CARE
I, Diana Chahal, performed the initial face to face bedside interview with this patient regarding history of present illness, review of symptoms and relevant past medical, social and family history.  I completed an independent physical examination.  I was the initial provider who evaluated this patient. I have signed out the follow up of any pending tests (i.e. labs, radiological studies) to the ACP.  I have communicated the patient’s plan of care and disposition with the ACP.  The history, relevant review of systems, past medical and surgical history, medical decision making, and physical examination was documented by the scribe in my presence and I attest to the accuracy of the documentation.

## 2023-11-05 NOTE — ED PROVIDER NOTE - NS ED ROS FT
Const: Denies fever, chills  HEENT: Denies blurry vision, sore throat  Neck: Denies neck pain/stiffness  Resp: + SOB (resolved). Denies coughing  Cardiovascular: + palpitations (resolved). Denies CP, LE edema  GI: Denies nausea, vomiting, abdominal pain, diarrhea, constipation, blood in stool  : Denies urinary frequency/urgency/dysuria, hematuria  MSK: Denies back pain  Neuro: Denies HA, dizziness, numbness, weakness  Skin: Denies rashes.

## 2023-11-05 NOTE — ED ADULT NURSE NOTE - NSFALLUNIVINTERV_ED_ALL_ED
Bed/Stretcher in lowest position, wheels locked, appropriate side rails in place/Call bell, personal items and telephone in reach/Instruct patient to call for assistance before getting out of bed/chair/stretcher/Non-slip footwear applied when patient is off stretcher/[place] to call system/Physically safe environment - no spills, clutter or unnecessary equipment/Purposeful proactive rounding/Room/bathroom lighting operational, light cord in reach

## 2023-11-05 NOTE — ED PROVIDER NOTE - CARE PROVIDER_API CALL
Cuong Guzman  Cardiovascular Disease  [address] [place] [id number]  Phone: [phone]  Fax: [phone]  Follow Up Time: Urgent

## 2023-11-05 NOTE — ED PROVIDER NOTE - NSFOLLOWUPINSTRUCTIONS_ED_ALL_ED_FT
Supraventricular Tachycardia, Adult  Supraventricular tachycardia (SVT) is a kind of abnormal heartbeat. It makes your heart beat very fast. This may last for a short time and then return to normal, or it may last longer.    A normal resting heartbeat is 60–100 times a minute. This condition can make your heart beat more than 150 times a minute. Times of having a fast heartbeat (episodes) can be scary, but they are usually not dangerous. In some cases, they may lead to heart failure if they:  Happen many times a day.  Last longer than a few seconds.  What are the causes?    This condition happens when electrical signals are sent out from areas of the heart that do not normally send signals for the heartbeat.    What increases the risk?  You are more likely to develop this condition if you are:  Middle aged or younger.  Female.  The following factors may also make you more likely to develop this condition:  Stress.  Feeling worried or nervous (anxiety).  Tiredness.  Smoking.  Stimulant drugs, such as cocaine and methamphetamine.  Alcohol.  Caffeine.  Pregnancy.  Having certain medical conditions.  What are the signs or symptoms?  A pounding heart.  A feeling that your heart is skipping beats (palpitations).  Weakness.  Trouble getting enough air.  Pain or tightness in your chest.  Dizziness or feeling like you are going to pass out (faint).  Feeling worried or nervous.  Sweating.  Feeling like you may vomit (nausea).  Passing out.  Tiredness.  Sometimes, there are no symptoms.    How is this treated?  Treatment may include:  Vagal nerve stimulation. Ways to do this include:  Holding your breath and pushing, as though you are pooping (having a bowel movement).  Massaging an area on one side of your neck. Do not try this yourself. Only a doctor should do this. If done the wrong way, it can lead to a stroke.  Bending forward with your head between your legs.  Coughing while bending forward with your head between your legs.  Putting an ice-cold, wet towel on your face.  Medicines that prevent attacks.  Medicine to stop an attack given through an IV tube at the hospital.  A small electric shock (cardioversion) that stops an attack.  A procedure to get rid of cells in the area that is causing the fast heartbeats (radiofrequency ablation).  If you do not have symptoms, you may not need treatment.    Follow these instructions at home:  Stress    Avoid things that make you feel stressed.  To deal with stress, try:  Doing yoga or meditation.  Being out in nature.  Listening to relaxing music.  Doing deep breathing.  Taking steps to be healthy, such as getting lots of sleep, exercising, and eating a balanced diet.  Talking with a mental health doctor.  Lifestyle      Try to get at least 7 hours of sleep each night.  Do not smoke or use any products that contain nicotine or tobacco. If you need help quitting, ask your doctor.  Do not drink alcohol if it gives you a fast heartbeat.  If alcohol does not seem to give you a fast heartbeat, limit your alcohol use. If you drink alcohol:  Limit how much you have to:  0–1 drink a day for women who are not pregnant.  0–2 drinks a day for men.  Know how much alcohol is in your drink. In the [place] one drink equals one 12 oz bottle of beer (355 mL), one 5 oz glass of wine (148 mL), or one 1½ oz glass of hard liquor (44 mL).  Be aware of how caffeine affects you.  If caffeine gives you a fast heartbeat, do not eat, drink, or use anything with caffeine in it.  If caffeine does not seem to give you a fast heartbeat, limit how much caffeine you eat, drink, or use.  Do not use stimulant drugs. If you need help quitting, ask your doctor.  General instructions    Stay at a healthy weight.  Exercise regularly. Ask your doctor about good activities for you. Try one or a mixture of these:  150 minutes a week of gentle exercise, like walking or yoga.  75 minutes a week of exercise that is very active, like running or swimming.  Do vagus nerve treatments to slow down your heartbeat as told by your doctor.  Take over-the-counter and prescription medicines only as told by your doctor.  Keep all follow-up visits.  Contact a doctor if:  You have a fast heartbeat more often.  Times of having a fast heartbeat last longer than before.  Home treatments to slow down your heartbeat do not help.  You have new symptoms.  Get help right away if:  You have chest pain.  Your symptoms get worse.  You have trouble breathing.  Your heart beats very fast for more than 20 minutes.  You pass out.  These symptoms may be an emergency. Get medical help right away. Call your local emergency services (911 in the [place]  Do not wait to see if the symptoms will go away.  Do not drive yourself to the hospital.  Summary  SVT is a type of abnormal heartbeat.  This condition can make your heart beat more than 150 times a minute.  If you do not have symptoms, you may not need treatment.  This information is not intended to replace advice given to you by your health care provider. Make sure you discuss any questions you have with your health care provider.

## 2023-11-05 NOTE — ED ADULT NURSE NOTE - NSFALLUNIVINTERV_ED_ALL_ED
Bed/Stretcher in lowest position, wheels locked, appropriate side rails in place/Call bell, personal items and telephone in reach/Instruct patient to call for assistance before getting out of bed/chair/stretcher/Non-slip footwear applied when patient is off stretcher/[place] to call system/Physically safe environment - no spills, clutter or unnecessary equipment/Purposeful proactive rounding/Room/bathroom lighting operational, light cord in reach

## 2023-11-05 NOTE — ED PROVIDER NOTE - OBJECTIVE STATEMENT
24 y/o male with PMHx of SVT presents to the ED c/o palpitations. Pt reports that he has been having palpitations for a year, had oe episode of SVT, saw cards, had 24 hour holter, has not [id number] since. Pt states he has been having episodes throughout the day. Pt denies drug use, denies caffeine use, denies steroid use, denies diarrhea, denies weight loss. Pt was seen in ED yesterday and was monitored on telemetry without any findings.

## 2023-11-05 NOTE — ED ADULT TRIAGE NOTE - CHIEF COMPLAINT QUOTE
pt intermittent palpitations that started yesterday and was here in ED yesterday for same pt has hx SVT

## 2023-11-05 NOTE — ED ADULT NURSE NOTE - OBJECTIVE STATEMENT
pt came to the for c/o intermittent palpitations that started yesterday and was here in ED yesterday for same pt has hx SVT

## 2023-11-05 NOTE — ED PROVIDER NOTE - NSFOLLOWUPINSTRUCTIONS_ED_ALL_ED_FT
Palpitations    A palpitation is the feeling that your heartbeat is irregular or is faster than normal. It may feel like your heart is fluttering or skipping a beat. They may be caused by many things, including smoking, caffeine, alcohol, stress, and certain medicines. Although most causes of palpitations are not serious, palpitations can be a sign of a serious medical problem. Avoid caffeine, alcohol, and tobacco products at home. Try to reduce stress and anxiety and make sure to get plenty of rest.     SEEK IMMEDIATE MEDICAL CARE IF YOU HAVE ANY OF THE FOLLOWING SYMPTOMS: chest pain, shortness of breath, severe headache, dizziness/lightheadedness, or fainting.    FOLLOW UP WITH YOUR CARDIOLOGIST WITHIN 1 WEEK.

## 2023-11-05 NOTE — ED PROVIDER NOTE - PHYSICAL EXAMINATION
Gen: anxious appearing  Head: NC/AT  Neck: trachea midline  Card: regular rate and rhythm  Resp:  No distress, CTAB  Abd: soft, non-tender, non-distended  Ext: no deformities  Neuro:  A&O appears non focal  Skin:  Warm, diaphoretic,

## 2023-11-05 NOTE — ED PROVIDER NOTE - OBJECTIVE STATEMENT
23-year-old male with past medical history SVT (not on medication) presents for palpitations that started when he was playing video games lasting about an hour to an hour and a half.  It self resolved.  He has not seen a cardiologist in 2 years.  He did wear an event monitor for 24 hours 2 years ago without events.  He notes 2 episodes last year and this is his first episode this year.  When he had the palpitations he felt lightheaded and short of breath.  He now feels well.  He previously followed with [place] [facility].  He occasionally drinks (did not drink alcohol today), denies smoking or illicit drugs.

## 2023-11-05 NOTE — ED PROVIDER NOTE - PATIENT PORTAL LINK FT
You can access the FollowMyHealth Patient Portal offered by [facility] by registering at the following website: [email or web address]. By joining [employer]’s FollowMyHealth portal, you will also be able to view your health information using other applications (apps) compatible with our system.

## 2023-11-06 ENCOUNTER — EMERGENCY (EMERGENCY)
Facility: HOSPITAL | Age: 23
LOS: 1 days | Discharge: HOME | End: 2023-11-06
Attending: EMERGENCY MEDICINE
Payer: COMMERCIAL

## 2023-11-06 VITALS
TEMPERATURE: 98 F | SYSTOLIC BLOOD PRESSURE: 131 MMHG | WEIGHT: 170.64 LBS | OXYGEN SATURATION: 98 % | DIASTOLIC BLOOD PRESSURE: 82 MMHG | HEART RATE: 71 BPM | RESPIRATION RATE: 18 BRPM | HEIGHT: 72 IN

## 2023-11-06 VITALS
RESPIRATION RATE: 18 BRPM | HEART RATE: 74 BPM | OXYGEN SATURATION: 99 % | DIASTOLIC BLOOD PRESSURE: 74 MMHG | TEMPERATURE: 98 F | SYSTOLIC BLOOD PRESSURE: 122 MMHG

## 2023-11-06 PROBLEM — I47.10 SUPRAVENTRICULAR TACHYCARDIA, UNSPECIFIED: Chronic | Status: ACTIVE | Noted: 2023-11-05

## 2023-11-06 PROCEDURE — 93010 ELECTROCARDIOGRAM REPORT: CPT

## 2023-11-06 PROCEDURE — 93005 ELECTROCARDIOGRAM TRACING: CPT

## 2023-11-06 PROCEDURE — 99283 EMERGENCY DEPT VISIT LOW MDM: CPT | Mod: 25

## 2023-11-06 PROCEDURE — 99284 EMERGENCY DEPT VISIT MOD MDM: CPT

## 2023-11-06 RX ORDER — SODIUM CHLORIDE 9 MG/ML
1000 INJECTION INTRAMUSCULAR; INTRAVENOUS; SUBCUTANEOUS ONCE
Refills: 0 | Status: COMPLETED | OUTPATIENT
Start: 2023-11-06 | End: 2023-11-06

## 2023-11-06 RX ORDER — HYDROXYZINE HCL 10 MG
1 TABLET ORAL
Qty: 15 | Refills: 0
Start: 2023-11-06 | End: 2023-11-10

## 2023-11-06 RX ADMIN — Medication 1 MILLIGRAM(S): at 10:37

## 2023-11-06 RX ADMIN — SODIUM CHLORIDE 1000 MILLILITER(S): 9 INJECTION INTRAMUSCULAR; INTRAVENOUS; SUBCUTANEOUS at 10:38

## 2023-11-06 NOTE — ED STATDOCS - CLINICAL SUMMARY MEDICAL DECISION MAKING FREE TEXT BOX
24 y/o male hx of reported SVT, seen here 2 prior times w/o SVT with negative TSH/labs p/w panicky and tremulous. No alcohol or substance use. No other psychiatric complaints.  Does not want to speak with psych. EKG is sinus rachael. Will treat symptoms and reassess. 24 y/o male hx of reported SVT, seen here 2 prior times w/o SVT with negative TSH/labs p/w panicky and tremulous. No alcohol or substance use. No other psychiatric complaints.  Does not want to speak with psych. EKG is sinus rachael. Will treat symptoms and reassess.    Pt resting comfortably in NAD, reports relief of presenting symptoms. Will rx atarax and refer to [facility].

## 2023-11-06 NOTE — ED STATDOCS - OBJECTIVE STATEMENT
24 y/o male hx of SVT p/w several days of feeling panicky and shaky. Seen here yesterday with normal TSH, labs and given Metroprolol. over the past 2 visits has not had SVT on tele. States feels anxious and feels like he's having a heart attack. No significant CP or difficulty breathing. Denies drugs, alcohol or caffeine. Denies SI/HI. When asked about stressors states his best friend  this past month but states not really thinking about and doesn't want to psych or anyone else.

## 2023-11-06 NOTE — ED STATDOCS - NS ED ROS FT
ROS: +tremulous and panicky. No fever/chills. No eye pain/changes in vision, No ear pain/sore throat/dysphagia, No chest pain/palpitations. No SOB/cough/. No abdominal pain, N/V/D, no black/bloody bm. No dysuria/frequency/discharge, No headache. No Dizziness.    No rashes or breaks in skin. No numbness/tingling/weakness.

## 2023-11-06 NOTE — ED STATDOCS - PATIENT PORTAL LINK FT
You can access the FollowMyHealth Patient Portal offered by [facility] by registering at the following website: [email or web address]. By joining [employer]’s FollowMyHealth portal, you will also be able to view your health information using other applications (apps) compatible with our system.

## 2023-11-06 NOTE — ED ADULT TRIAGE NOTE - CHIEF COMPLAINT QUOTE
"I'm having a panic attack, I'm having a lot of chest pressure and shakiness". denies drugs/alcohol, denies si/hi.

## 2023-11-06 NOTE — ED STATDOCS - NSFOLLOWUPINSTRUCTIONS_ED_ALL_ED_FT
[facility]: Administration Offices [address] [place] [id number] [phone] [facility] [place] [address], [place] [address] [phone] [facility] is one of the foremost providers of mental health services on [place], treating all aspects of psychiatric illness and emotional distress for every age group and socioeconomic background. Our expert staff works collaboratively with healthcare providers and care management services to achieve true integrated healthcare for individuals impacted by mental illness—and the families who love them.     Anxiety    Generalized anxiety disorder (VIRGIL) is a mental disorder. It is defined as anxiety that is not necessarily related to specific events or activities or is out of proportion to said events. Symptoms include restlessness, fatigue, difficulty concentrations, irritability and difficulty concentrating. It may interfere with life functions, including relationships, work, and school. If you were started on a medication, make sure to take exactly as prescribed and follow up with a psychiatrist.    SEEK IMMEDIATE MEDICAL CARE IF YOU HAVE ANY OF THE FOLLOWING SYMPTOMS: thoughts about hurting killing yourself, thoughts about hurting or killing somebody else, hallucinations, or worsening depression.

## 2023-11-06 NOTE — ED STATDOCS - PHYSICAL EXAMINATION
Gen: nontoxic, but tremulous and anxious appearing   HEENT: Mucous membranes moist, pink conjunctivae, EOMI  CV: RRR, nl s1/s2.  Resp: CTAB, normal rate and effort  GI: Abdomen soft, NT, ND. No rebound, no guarding  : No CVAT  Neuro: A&O x 3, moving all 4 extremities  MSK: No spine or joint tenderness to palpation  Skin: No rashes. intact and perfused.

## 2023-11-06 NOTE — ED ADULT NURSE NOTE - OBJECTIVE STATEMENT
23 yom presents to ed with panic attack, this is the third day in a row. pt denies hx anxiety/ depression. pt denies hi denies SI. initally shaking and states he has been sweating profusely and feeling "weird in my chest"

## 2024-10-28 NOTE — ED ADULT TRIAGE NOTE - TEMPERATURE IN CELSIUS (DEGREES C)
Dr. Morales,    The pictures go with the other message I sent you. The pictures were taken 10/27/2024. All bleeding has resolved at this time.     Thanks,  Liss ALVARENGA     36.8